# Patient Record
(demographics unavailable — no encounter records)

---

## 2025-01-06 NOTE — DATA REVIEWED
[Imaging Present] : Present [de-identified] : Two ultrasounds done 9/27/2024: At the right upper extremity there is a 1.6 x 0.4 x 1 .3 cm subcutaneous mass that was read out as lipoma. At the right upper back there is a 3.2 x 3.2 x 1.0 cm well-circumscribed hypoechoic soft tissue mass.

## 2025-01-06 NOTE — HISTORY OF PRESENT ILLNESS
[Stable] : stable [0] : currently ~his/her~ pain is 0 out of 10 [FreeTextEntry1] : This is a 45 year old man who has had 8-9 months of an increasing mass felt on his mass as well as his right volar forearm. He thinks the one on his forearm is definitely getting bigger although he is not able to track the size of the one on his back. He went to dermatologist who sent him for an ultrasound of both, though he would not do an MRI due to claustrophobia, and was then sent to me. The mass does not cause a significant amount of pain; he only notices it when she golfs and he feels some slight pulling at the area. He is otherwise intact besides these and does not feel any other masses.

## 2025-01-06 NOTE — DISCUSSION/SUMMARY
[All Questions Answered] : Patient (and family) had all questions answered to an agreeable level of satisfaction [Interested in Proceeding] : Patient (and family) expressed understanding and interest in proceeding with the plan as outlined [de-identified] : Right forearm and right mid back masses. 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 I would like to get an MRI of these as I would like to image these better prior to resection to better determine questions about fat vs. some other type of mass.  Follow up after MRI scans. Because of his claustrophobia and difficulty with MRI scans before, I have written him for Valium for the procedure.  If imaging or pathology/biopsy was ordered, the patient was told to make an appointment to review findings right after all imaging is completed.   We discussed risks, benefits and alternatives. Rationale of care was reviewed and all questions were answered.

## 2025-01-06 NOTE — END OF VISIT
[FreeTextEntry3] : All medical record entries made by the Scribe were at my, Dr. Srikanth Bahena, direction and personally dictated by me on 01/06/2025. I have reviewed the chart and agree that the record accurately reflects my personal performance of the history, physical exam, assessment and plan. I have also personally directed, reviewed, and agreed with the chart.

## 2025-01-06 NOTE — ADDENDUM
[FreeTextEntry1] : I, Steven Salas, documented this note as a scribe on behalf of Dr. Srikanth Bahena on 01/06/2025.

## 2025-01-06 NOTE — PHYSICAL EXAM
[General Appearance - Well-Appearing] : Well appearing [General Appearance - Well Nourished] : well nourished [Oriented To Time, Place, And Person] : Oriented to person, place, and time [Sclera] : the sclera and conjunctiva were normal [Neck Cervical Mass (___cm)] : no neck mass was observed [Heart Rate And Rhythm] : heart rate was normal and rhythm regular [] : No respiratory distress [Abdomen Soft] : Soft [Normal Station and Gait] : gait and station were normal [FreeTextEntry1] : On exam the patient stands in good balance. He is RHD with full right shoulder and right elbow ROM. At the right volar forearm there is a less than 2 cm mass in the subcutaneous tissues. It is rubbery. There are no Tinel's, thrills, or bruits. At the mid right back just at the inferior angle of the scapula there is a 3.5 cm mass that is at the level of the fascia. It is also mobile and rubbery with no Tinel's, thrills, or bruits. There is no regional lymphadenopathy. He is neurovascularly intact.

## 2025-01-06 NOTE — REVIEW OF SYSTEMS
[Skin Lesions] : skin lesion [Nl] : Hematologic/Lymphatic [Joint Pain] : no joint pain [Joint Stiffness] : no joint stiffness